# Patient Record
Sex: MALE | Race: WHITE | URBAN - METROPOLITAN AREA
[De-identification: names, ages, dates, MRNs, and addresses within clinical notes are randomized per-mention and may not be internally consistent; named-entity substitution may affect disease eponyms.]

---

## 2023-05-02 ENCOUNTER — WEB ENCOUNTER (OUTPATIENT)
Dept: URBAN - METROPOLITAN AREA CLINIC 115 | Facility: CLINIC | Age: 23
End: 2023-05-02

## 2023-05-02 ENCOUNTER — OFFICE VISIT (OUTPATIENT)
Dept: URBAN - METROPOLITAN AREA CLINIC 115 | Facility: CLINIC | Age: 23
End: 2023-05-02
Payer: COMMERCIAL

## 2023-05-02 ENCOUNTER — LAB OUTSIDE AN ENCOUNTER (OUTPATIENT)
Dept: URBAN - METROPOLITAN AREA CLINIC 115 | Facility: CLINIC | Age: 23
End: 2023-05-02

## 2023-05-02 VITALS
HEART RATE: 103 BPM | DIASTOLIC BLOOD PRESSURE: 82 MMHG | TEMPERATURE: 99.8 F | SYSTOLIC BLOOD PRESSURE: 120 MMHG | BODY MASS INDEX: 17.67 KG/M2 | HEIGHT: 60 IN | WEIGHT: 90 LBS

## 2023-05-02 DIAGNOSIS — R15.2 FECAL URGENCY: ICD-10-CM

## 2023-05-02 DIAGNOSIS — R63.4 UNINTENTIONAL WEIGHT LOSS: ICD-10-CM

## 2023-05-02 DIAGNOSIS — K21.9 CHRONIC GERD: ICD-10-CM

## 2023-05-02 PROBLEM — 235595009: Status: ACTIVE | Noted: 2023-05-02

## 2023-05-02 PROCEDURE — 99203 OFFICE O/P NEW LOW 30 MIN: CPT | Performed by: INTERNAL MEDICINE

## 2023-05-02 RX ORDER — FAMOTIDINE 10 MG/1
1 TABLET AS NEEDED TABLET, FILM COATED ORAL TWICE A DAY
Status: ACTIVE | COMMUNITY

## 2023-05-02 NOTE — HPI-TODAY'S VISIT:
autism, on adhd med. lost 20-25 lbs overall. has fecal urgency, dietary triggered, without sig diarrhea or bloody stool. chr gerd, not fully controlled w famotidine daily. no dysphagia. hx needing growth hormone replacement, then reports no recent endocrine abnml. no abd pain. seemed to lose more weight after covid.

## 2023-05-04 ENCOUNTER — TELEPHONE ENCOUNTER (OUTPATIENT)
Dept: URBAN - METROPOLITAN AREA CLINIC 35 | Facility: CLINIC | Age: 23
End: 2023-05-04

## 2023-05-04 LAB
A/G RATIO: 2
ALBUMIN: 5.3
ALKALINE PHOSPHATASE: 54
ALT (SGPT): 12
ANA SCREEN, IFA: NEGATIVE
AST (SGOT): 14
BILIRUBIN, TOTAL: 0.7
BUN/CREATININE RATIO: (no result)
BUN: 13
C-REACTIVE PROTEIN, QUANT: <0.2
CALCIUM: 10.4
CARBON DIOXIDE, TOTAL: 26
CHLORIDE: 103
CREATININE: 0.98
EGFR: 112
FERRITIN, SERUM: 49
FOLATE (FOLIC ACID), SERUM: 18.6
GLOBULIN, TOTAL: 2.6
GLUCOSE: 92
HEMATOCRIT: 45.5
HEMOGLOBIN: 15.6
IMMUNOGLOBULIN A, QN, SERUM: 347
INTERPRETATION: (no result)
IRON BIND.CAP.(TIBC): 301
IRON SATURATION: 23
IRON: 69
MCH: 29.9
MCHC: 34.3
MCV: 87.2
MPV: 11.2
PLATELET COUNT: 219
POTASSIUM: 4.6
PROTEIN, TOTAL: 7.9
RDW: 12
RED BLOOD CELL COUNT: 5.22
RHEUMATOID FACTOR: <14
SJOGREN'S ANTIBODY (SS-A): (no result)
SJOGREN'S ANTIBODY (SS-B): (no result)
SODIUM: 140
T-TRANSGLUTAMINASE (TTG) IGA: <1
TSH W/REFLEX TO FT4: 1.48
VITAMIN B12: 351
VITAMIN D,25-OH,TOTAL,IA: 13
WHITE BLOOD CELL COUNT: 7.8

## 2023-05-18 LAB
ADENOVIRUS F 40/41: NOT DETECTED
CAMPYLOBACTER: NOT DETECTED
CLOSTRIDIUM DIFFICILE: NOT DETECTED
CRYPTOSPORIDIUM: NOT DETECTED
ENTAMOEBA HISTOLYTICA: NOT DETECTED
ENTEROAGGREGATIVE E.COLI: NOT DETECTED
ENTEROTOXIGENIC E.COLI: NOT DETECTED
ESCHERICHIA COLI O157: NOT DETECTED
GIARDIA LAMBLIA: NOT DETECTED
NOROVIRUS GI/GII: NOT DETECTED
ROTAVIRUS A: NOT DETECTED
SALMONELLA SPP.: NOT DETECTED
SHIGA-LIKE TOXIN PRODUCING E.COLI: NOT DETECTED
SHIGELLA SPP. / ENTEROINVASIVE E.COLI: NOT DETECTED
VIBRIO PARAHAEMOLYTICUS: NOT DETECTED
VIBRIO SPP.: NOT DETECTED
YERSINIA ENTEROCOLITICA: NOT DETECTED

## 2023-05-21 ENCOUNTER — LAB OUTSIDE AN ENCOUNTER (OUTPATIENT)
Dept: URBAN - METROPOLITAN AREA CLINIC 115 | Facility: CLINIC | Age: 23
End: 2023-05-21

## 2023-05-26 LAB
ADENOVIRUS F 40/41: NOT DETECTED
CALPROTECTIN, STOOL - QDX: (no result)
CAMPYLOBACTER: NOT DETECTED
CLOSTRIDIUM DIFFICILE: NOT DETECTED
CRYPTOSPORIDIUM: NOT DETECTED
ENTAMOEBA HISTOLYTICA: NOT DETECTED
ENTEROAGGREGATIVE E.COLI: NOT DETECTED
ENTEROTOXIGENIC E.COLI: NOT DETECTED
ESCHERICHIA COLI O157: NOT DETECTED
GIARDIA LAMBLIA: NOT DETECTED
NOROVIRUS GI/GII: NOT DETECTED
OVA AND PARASITE - QDX: (no result)
PANCREATICELASTASE ELISA, STOOL: (no result)
ROTAVIRUS A: NOT DETECTED
SALMONELLA SPP.: NOT DETECTED
SHIGA-LIKE TOXIN PRODUCING E.COLI: NOT DETECTED
SHIGELLA SPP. / ENTEROINVASIVE E.COLI: NOT DETECTED
VIBRIO PARAHAEMOLYTICUS: NOT DETECTED
VIBRIO SPP.: NOT DETECTED
YERSINIA ENTEROCOLITICA: NOT DETECTED

## 2023-06-02 ENCOUNTER — OFFICE VISIT (OUTPATIENT)
Dept: URBAN - METROPOLITAN AREA CLINIC 115 | Facility: CLINIC | Age: 23
End: 2023-06-02

## 2023-07-21 ENCOUNTER — OFFICE VISIT (OUTPATIENT)
Dept: URBAN - METROPOLITAN AREA CLINIC 115 | Facility: CLINIC | Age: 23
End: 2023-07-21
Payer: COMMERCIAL

## 2023-07-21 ENCOUNTER — DASHBOARD ENCOUNTERS (OUTPATIENT)
Age: 23
End: 2023-07-21

## 2023-07-21 VITALS
DIASTOLIC BLOOD PRESSURE: 75 MMHG | SYSTOLIC BLOOD PRESSURE: 113 MMHG | BODY MASS INDEX: 17.2 KG/M2 | WEIGHT: 87.6 LBS | TEMPERATURE: 97.3 F | HEART RATE: 85 BPM | HEIGHT: 60 IN

## 2023-07-21 DIAGNOSIS — E55.9 VITAMIN D DEFICIENCY: ICD-10-CM

## 2023-07-21 PROBLEM — 34713006: Status: ACTIVE | Noted: 2023-07-21

## 2023-07-21 PROCEDURE — 99212 OFFICE O/P EST SF 10 MIN: CPT | Performed by: INTERNAL MEDICINE

## 2023-07-21 RX ORDER — FAMOTIDINE 10 MG/1
1 TABLET AS NEEDED TABLET, FILM COATED ORAL TWICE A DAY
Status: ACTIVE | COMMUNITY

## 2023-07-21 NOTE — HPI-TODAY'S VISIT:
Labs reviewed, low vit D, s/p repletion. GPP stool normal. Hld off on CT scan since sx resolved. Prior hx: autism, on adhd med. lost 20-25 lbs overall. has fecal urgency, dietary triggered, without sig diarrhea or bloody stool. chr gerd, not fully controlled w famotidine daily. no dysphagia. hx needing growth hormone replacement, then reports no recent endocrine abnml. no abd pain. seemed to lose more weight after covid.